# Patient Record
Sex: FEMALE | Race: WHITE | NOT HISPANIC OR LATINO | URBAN - METROPOLITAN AREA
[De-identification: names, ages, dates, MRNs, and addresses within clinical notes are randomized per-mention and may not be internally consistent; named-entity substitution may affect disease eponyms.]

---

## 2017-02-20 ENCOUNTER — INPATIENT HOSPITAL (OUTPATIENT)
Dept: URBAN - METROPOLITAN AREA HOSPITAL 93 | Facility: HOSPITAL | Age: 82
End: 2017-02-20

## 2017-02-20 DIAGNOSIS — Z79.01 LONG TERM (CURRENT) USE OF ANTICOAGULANTS: ICD-10-CM

## 2017-02-20 DIAGNOSIS — K92.1 MELENA: ICD-10-CM

## 2017-02-20 DIAGNOSIS — K25.0 ACUTE GASTRIC ULCER WITH HEMORRHAGE: ICD-10-CM

## 2017-02-20 DIAGNOSIS — D62 ACUTE POSTHEMORRHAGIC ANEMIA: ICD-10-CM

## 2017-02-20 PROCEDURE — 43239 EGD BIOPSY SINGLE/MULTIPLE: CPT | Performed by: INTERNAL MEDICINE

## 2017-02-20 PROCEDURE — 99223 1ST HOSP IP/OBS HIGH 75: CPT | Mod: 25 | Performed by: INTERNAL MEDICINE

## 2017-02-21 ENCOUNTER — INPATIENT HOSPITAL (OUTPATIENT)
Dept: URBAN - METROPOLITAN AREA HOSPITAL 93 | Facility: HOSPITAL | Age: 82
End: 2017-02-21

## 2017-02-21 DIAGNOSIS — D62 ACUTE POSTHEMORRHAGIC ANEMIA: ICD-10-CM

## 2017-02-21 DIAGNOSIS — K25.0 ACUTE GASTRIC ULCER WITH HEMORRHAGE: ICD-10-CM

## 2017-02-21 DIAGNOSIS — K92.1 MELENA: ICD-10-CM

## 2017-02-21 DIAGNOSIS — Z79.01 LONG TERM (CURRENT) USE OF ANTICOAGULANTS: ICD-10-CM

## 2017-02-21 PROCEDURE — 99232 SBSQ HOSP IP/OBS MODERATE 35: CPT | Performed by: INTERNAL MEDICINE

## 2017-02-22 PROCEDURE — 99232 SBSQ HOSP IP/OBS MODERATE 35: CPT | Performed by: INTERNAL MEDICINE

## 2017-04-06 ENCOUNTER — OFFICE (OUTPATIENT)
Dept: URBAN - METROPOLITAN AREA CLINIC 10 | Facility: CLINIC | Age: 82
End: 2017-04-06
Payer: MEDICARE

## 2017-04-06 VITALS
HEART RATE: 97 BPM | WEIGHT: 164 LBS | HEIGHT: 64 IN | SYSTOLIC BLOOD PRESSURE: 128 MMHG | DIASTOLIC BLOOD PRESSURE: 68 MMHG

## 2017-04-06 DIAGNOSIS — K25.4 CHRONIC OR UNSPECIFIED GASTRIC ULCER WITH HEMORRHAGE: ICD-10-CM

## 2017-04-06 LAB
CBC COMPLETE BLOOD COUNT W/O DIFF: HEMATOCRIT: 33.4 % — LOW (ref 36–48)
CBC COMPLETE BLOOD COUNT W/O DIFF: HEMOGLOBIN: 11.1 G/DL — LOW (ref 12–16)
CBC COMPLETE BLOOD COUNT W/O DIFF: MCH: 28.5 PG (ref 25–35)
CBC COMPLETE BLOOD COUNT W/O DIFF: MCHC: 33.2 % (ref 30–38)
CBC COMPLETE BLOOD COUNT W/O DIFF: MCV: 85.9 FL (ref 78–102)
CBC COMPLETE BLOOD COUNT W/O DIFF: PLATELET COUNT: 266 K/UL (ref 150–450)
CBC COMPLETE BLOOD COUNT W/O DIFF: RBC DISTRIBUTION WIDTH: 15 % (ref 11.5–16)
CBC COMPLETE BLOOD COUNT W/O DIFF: RED BLOOD CELL COUNT: 3.89 M/UL — LOW (ref 4–5.5)
CBC COMPLETE BLOOD COUNT W/O DIFF: WHITE BLOOD CELL COUNT: 7.1 K/UL (ref 4–11)

## 2017-04-06 PROCEDURE — G8427 DOCREV CUR MEDS BY ELIG CLIN: HCPCS | Performed by: INTERNAL MEDICINE

## 2017-04-06 PROCEDURE — 99212 OFFICE O/P EST SF 10 MIN: CPT | Performed by: INTERNAL MEDICINE

## 2017-04-06 RX ORDER — PANTOPRAZOLE SODIUM 40 MG/1
TABLET, DELAYED RELEASE ORAL
Qty: 90 | Refills: 3 | Status: ACTIVE
Start: 2017-04-06

## 2017-04-06 NOTE — SERVICENOTES
Discussed follow up EGD for gastric ulcer but given likely benign ulcers, age, and comorbidities will not repeat EGD. Family in agreement.

## 2017-04-06 NOTE — SERVICEHPINOTES
Ms. Deluca is 85 years old. She is here for initial visit with me for hospital follow up for finding of gastric ulcers. She denies any GI bleeding and feel well. She was taking xarelto for afib. She is currently not taking.2/2017–EGD by Dr. Osborne for anemia: 2 antral ulcers otherwise normal exam. Biopsies of the antrum demonstrated variable chronic superficial gastritis without H. pylori